# Patient Record
Sex: MALE | Race: WHITE | Employment: FULL TIME | URBAN - METROPOLITAN AREA
[De-identification: names, ages, dates, MRNs, and addresses within clinical notes are randomized per-mention and may not be internally consistent; named-entity substitution may affect disease eponyms.]

---

## 2018-04-25 ENCOUNTER — HOSPITAL ENCOUNTER (OUTPATIENT)
Dept: SURGERY | Age: 63
Discharge: HOME | End: 2018-04-25

## 2018-04-25 VITALS — HEIGHT: 72 IN | WEIGHT: 210 LBS | BODY MASS INDEX: 28.44 KG/M2

## 2018-04-25 NOTE — PERIOP NOTES
Patient verified name, , and procedure. Type: 1a; abbreviated assessment per anesthesia guidelines  Labs per surgeon: none  Labs per anesthesia: none    Instructed pt that they will be notified via office/GI LAB for time of arrival to GI lab. Follow diet and prep instructions per Dr Suzanne Lincoln instructions as follows: You will be on a clear liquid diet the day before your procedure and you may have any of the following clear liquids:   Water.  Strained fruit juices, without pulp, including apple, orange, white grape, or white cranberry.  Limeade or lemonade.  Coffee or tea (do not use any non-dairy creamer.)   Chicken broth.  Gelatin desserts, without added fruit or toppings (no red or purple gelatin.)  You should NOT:   Do NOT drink any milk products or use any milk products in coffee or tea.  Do NOT drink anything with red or purple dye.  Do NOT drink any alcoholic beverages. Bath or shower the night before and the am of surgery with non-moisturizing soap. No lotions, oils, powders, cologne on skin. No make up, eye make up or jewelry. Wear loose fitting comfortable, clean clothing. Must have adult present in building the entire time and MUST bring someone with you or arrange for someone to drive you home after the test.      You may take medications up to 3 hours prior to your procedure with sips of water only. Medications to take use Flonase nasal spray, if needed; Sildenafil, patient to hold none    The following discharge instructions reviewed with patient: medication given during procedure may cause drowsiness for several hours, therefore, do not drive or operate machinery for remainder of the day, no alcohol on the day of your procedure, resume regular diet and activity unless otherwise directed, you may experience abdominal distention for several hours that is relieved by the passage of gas.  Contact your physician if you have any of the following: fever or chills, severe abdominal pain or excessive amount of bleeding or a large amount when having a bowel movement.  Occasional specks of blood with bowel movement would not be unusual.

## 2018-04-30 ENCOUNTER — ANESTHESIA EVENT (OUTPATIENT)
Dept: ENDOSCOPY | Age: 63
End: 2018-04-30
Payer: COMMERCIAL

## 2018-04-30 ENCOUNTER — HOSPITAL ENCOUNTER (OUTPATIENT)
Age: 63
Setting detail: OUTPATIENT SURGERY
Discharge: HOME | End: 2018-04-30
Attending: SURGERY | Admitting: SURGERY
Payer: COMMERCIAL

## 2018-04-30 ENCOUNTER — ANESTHESIA (OUTPATIENT)
Dept: ENDOSCOPY | Age: 63
End: 2018-04-30
Payer: COMMERCIAL

## 2018-04-30 VITALS
BODY MASS INDEX: 28.4 KG/M2 | OXYGEN SATURATION: 98 % | SYSTOLIC BLOOD PRESSURE: 116 MMHG | DIASTOLIC BLOOD PRESSURE: 67 MMHG | TEMPERATURE: 97.8 F | WEIGHT: 209.44 LBS | HEART RATE: 59 BPM | RESPIRATION RATE: 18 BRPM

## 2018-04-30 PROBLEM — Z12.11 SCREENING FOR COLON CANCER: Status: ACTIVE | Noted: 2018-04-30

## 2018-04-30 PROCEDURE — 74011250637 HC RX REV CODE- 250/637: Performed by: ANESTHESIOLOGY

## 2018-04-30 PROCEDURE — 76060000031 HC ANESTHESIA FIRST 0.5 HR: Performed by: SURGERY

## 2018-04-30 PROCEDURE — 74011250636 HC RX REV CODE- 250/636

## 2018-04-30 PROCEDURE — 74011250636 HC RX REV CODE- 250/636: Performed by: ANESTHESIOLOGY

## 2018-04-30 PROCEDURE — 77030009426 HC FCPS BIOP ENDOSC BSC -B: Performed by: SURGERY

## 2018-04-30 PROCEDURE — 77030011640 HC PAD GRND REM COVD -A: Performed by: SURGERY

## 2018-04-30 PROCEDURE — 88305 TISSUE EXAM BY PATHOLOGIST: CPT | Performed by: SURGERY

## 2018-04-30 PROCEDURE — 76040000025: Performed by: SURGERY

## 2018-04-30 PROCEDURE — 74011000250 HC RX REV CODE- 250

## 2018-04-30 RX ORDER — SODIUM CHLORIDE, SODIUM LACTATE, POTASSIUM CHLORIDE, CALCIUM CHLORIDE 600; 310; 30; 20 MG/100ML; MG/100ML; MG/100ML; MG/100ML
25 INJECTION, SOLUTION INTRAVENOUS CONTINUOUS
Status: DISCONTINUED | OUTPATIENT
Start: 2018-04-30 | End: 2018-04-30 | Stop reason: HOSPADM

## 2018-04-30 RX ORDER — SODIUM CHLORIDE, SODIUM LACTATE, POTASSIUM CHLORIDE, CALCIUM CHLORIDE 600; 310; 30; 20 MG/100ML; MG/100ML; MG/100ML; MG/100ML
100 INJECTION, SOLUTION INTRAVENOUS CONTINUOUS
Status: CANCELLED | OUTPATIENT
Start: 2018-04-30

## 2018-04-30 RX ORDER — FAMOTIDINE 20 MG/1
20 TABLET, FILM COATED ORAL
Status: CANCELLED | OUTPATIENT
Start: 2018-04-30

## 2018-04-30 RX ORDER — FAMOTIDINE 20 MG/1
20 TABLET, FILM COATED ORAL ONCE
Status: COMPLETED | OUTPATIENT
Start: 2018-04-30 | End: 2018-04-30

## 2018-04-30 RX ORDER — PROPOFOL 10 MG/ML
INJECTION, EMULSION INTRAVENOUS
Status: DISCONTINUED | OUTPATIENT
Start: 2018-04-30 | End: 2018-04-30 | Stop reason: HOSPADM

## 2018-04-30 RX ORDER — SODIUM CHLORIDE 0.9 % (FLUSH) 0.9 %
5-10 SYRINGE (ML) INJECTION AS NEEDED
Status: DISCONTINUED | OUTPATIENT
Start: 2018-04-30 | End: 2018-04-30 | Stop reason: HOSPADM

## 2018-04-30 RX ORDER — LIDOCAINE HYDROCHLORIDE 20 MG/ML
INJECTION, SOLUTION EPIDURAL; INFILTRATION; INTRACAUDAL; PERINEURAL AS NEEDED
Status: DISCONTINUED | OUTPATIENT
Start: 2018-04-30 | End: 2018-04-30 | Stop reason: HOSPADM

## 2018-04-30 RX ORDER — SODIUM CHLORIDE, SODIUM LACTATE, POTASSIUM CHLORIDE, CALCIUM CHLORIDE 600; 310; 30; 20 MG/100ML; MG/100ML; MG/100ML; MG/100ML
75 INJECTION, SOLUTION INTRAVENOUS CONTINUOUS
Status: DISCONTINUED | OUTPATIENT
Start: 2018-04-30 | End: 2018-04-30 | Stop reason: HOSPADM

## 2018-04-30 RX ADMIN — LIDOCAINE HYDROCHLORIDE 20 MG: 20 INJECTION, SOLUTION EPIDURAL; INFILTRATION; INTRACAUDAL; PERINEURAL at 08:27

## 2018-04-30 RX ADMIN — SODIUM CHLORIDE, SODIUM LACTATE, POTASSIUM CHLORIDE, AND CALCIUM CHLORIDE: 600; 310; 30; 20 INJECTION, SOLUTION INTRAVENOUS at 08:24

## 2018-04-30 RX ADMIN — FAMOTIDINE 20 MG: 20 TABLET ORAL at 07:49

## 2018-04-30 RX ADMIN — SODIUM CHLORIDE, SODIUM LACTATE, POTASSIUM CHLORIDE, AND CALCIUM CHLORIDE 75 ML/HR: 600; 310; 30; 20 INJECTION, SOLUTION INTRAVENOUS at 07:49

## 2018-04-30 RX ADMIN — PROPOFOL 140 MCG/KG/MIN: 10 INJECTION, EMULSION INTRAVENOUS at 08:27

## 2018-04-30 NOTE — IP AVS SNAPSHOT
[facility] 
 
 
 [place] [id number] 
[phone] Patient: Cassie Marquez MRN: [id number] NDA:3/6/8385 About your hospitalization You were admitted on:  April 30, 2018 You last received care in the:  [facility] PACU You were discharged on:  April 30, 2018 Why you were hospitalized Your primary diagnosis was:  Screening For Colon Cancer Follow-up Information Follow up With Details Comments Contact Info Vimal Bettencourt DO  AS DIRECTED Melanie  [address] [facility] [address] 
[phone] Discharge Orders None A check brooklyn indicates which time of day the medication should be taken. My Medications ASK your doctor about these medications Instructions Each Dose to Equal  
 Morning Noon Evening Bedtime  
 allopurinol 300 mg tablet Commonly known as:  Jailyn Funez Your last dose was: Your next dose is: Take 1 Tab by mouth daily. Indications: Gout 300 mg  
    
   
   
   
  
 fluticasone 50 mcg/actuation nasal spray Commonly known as:  Cirilo Kohler Your last dose was: Your next dose is: 2 Sprays by Both Nostrils route daily. 2 Spray  
    
   
   
   
  
 multivitamin tablet Commonly known as:  ONE A DAY Your last dose was: Your next dose is: Take 1 Tab by mouth daily. 1 Tab  
    
   
   
   
  
 rosuvastatin 5 mg tablet Commonly known as:  CRESTOR Your last dose was: Your next dose is: Take 1 Tab by mouth nightly. 5 mg  
    
   
   
   
  
 sildenafil (antihypertensive) 20 mg tablet Commonly known as:  REVATIO Your last dose was: Your next dose is: Take 1 Tab by mouth three (3) times daily. 20 mg Discharge Instructions Gastrointestinal Colonoscopy/Flexible Sigmoidoscopy Lower Exam Discharge Instructions 1. Call your doctor for any problems or questions. 2. Contact the doctors office for follow up appointment as directed 3. Medication may cause drowsiness for several hours, therefore, do not drive or operate machinery for remainder of the day. 4. No alcohol today. 5. Ordinarily, you may resume regular diet and activity after exam unless otherwise specified by your physician. 6. Because of air put into your colon during exam, you may experience some abdominal distension, relieved by the passage of gas, for several hours. 7. Contact your physician if you have any of the following: 
a. Excessive amount of bleeding  large amount when having a bowel movement. Occasional specks of blood with bowel movement would not be unusual. 
b. Severe abdominal pain 
c. Fever or Chills Introducing [facility]! Landen Calix introduces [employer] patient portal. Now you can access parts of your medical record, email your doctor's office, and request medication refills online. 1. In your internet browser, go to [email or web address] [email or web address] 2. Click on the First Time User? Click Here link in the Sign In box. You will see the New Member Sign Up page. 3. Enter your [employer] Access Code exactly as it appears below. You will not need to use this code after youve completed the sign-up process. If you do not sign up before the expiration date, you must request a new code. · [employer] Access Code: [email or web address] Expires: 6/3/2018  9:27 AM 
 
4. Enter the last four digits of your Social Security Number (xxxx) and Date of Birth (mm/dd/yyyy) as indicated and click Submit. You will be taken to the next sign-up page. 5. Create a Meilelet ID. This will be your [employer] Engine login ID and cannot be changed, so think of one that is secure and easy to remember. 6. Create a Meilelet password. You can change your password at any time. 7. Enter your Password Reset Question and Answer. This can be used at a later time if you forget your password. 8. Enter your e-mail address. You will receive e-mail notification when new information is available in [address] 9. Click Sign Up. You can now view and download portions of your medical record. 10. Click the Download Summary menu link to download a portable copy of your medical information. If you have questions, please visit the Frequently Asked Questions section of the my4oneone website. Remember, my4oneone is NOT to be used for urgent needs. For medical emergencies, dial 911. Now available from your iPhone and Android! Introducing Torrey Carbajal As a [facility] patient, I wanted to make you aware of our electronic visit tool called Torrey [employer]. [place] 24/WowOwow allows you to connect within minutes with a medical provider 24 hours a day, seven days a week via a mobile device or tablet or logging into a secure website from your computer. You can access Torrey Carbajal from anywhere in the [place]. A virtual visit might be right for you when you have a simple condition and feel like you just dont want to get out of bed, or cant get away from work for an appointment, when your regular [facility] provider is not available (evenings, weekends or holidays), or when youre out of town and need minor care. Electronic visits cost only $49 and if the [place] 24/7 provider determines a prescription is needed to treat your condition, one can be electronically transmitted to a nearby pharmacy*. Please take a moment to enroll today if you have not already done so. The enrollment process is free and takes just a few minutes. To enroll, please download the LangoLab/WowOwow kaylyn to your tablet or phone, or visit [email or web address] to enroll on your computer.    
And, as an [address] [place] [address] patient with a [employer] account, the results of your visits will be scanned into your electronic medical record and your primary care provider will be able to view the scanned results. We urge you to continue to see your regular [employer] provider for your ongoing medical care. And while your primary care provider may not be the one available when you seek a Torrey Taylorjeremiahfin virtual visit, the peace of mind you get from getting a real diagnosis real time can be priceless. For more information on Torrey Taylorjeremiahfin, view our Frequently Asked Questions (FAQs) at [email or web address]. Sincerely, 
 
Payton Berman MD 
Chief Medical Officer [address] *:  certain medications cannot be prescribed via Torrey Taylordeepika Providers Seen During Your Hospitalization Provider Specialty Primary office phone Brayan Rae, [address] [place] [address] [phone] Your Primary Care Physician (PCP) Primary Care Physician Office Phone Office Fax [address] [address] [phone] You are allergic to the following No active allergies Recent Documentation Weight BMI Smoking Status 95 kg 28.4 kg/m2 Never Smoker Emergency Contacts Name Discharge Info Relation Home Work [facility] DISCHARGE CAREGIVER [3] Spouse [3] [phone] Patient Belongings The following personal items are in your possession at time of discharge: 
                             
 
  
  
 Please provide this summary of care documentation to your next provider. Signatures-by signing, you are acknowledging that this After Visit Summary has been reviewed with you and you have received a copy. Patient Signature:  ____________________________________________________________ Date:  ____________________________________________________________  
  
Favio Murray  Provider Signature: ____________________________________________________________ Date:  ____________________________________________________________

## 2018-04-30 NOTE — ANESTHESIA POSTPROCEDURE EVALUATION
Post-Anesthesia Evaluation and Assessment    Patient: Sofia Tello MRN: [id number]  SSN: [id number]    YOB: 1955  Age: 66 y.o. Sex: male       Cardiovascular Function/Vital Signs  Visit Vitals    /66 (BP 1 Location: Left arm, BP Patient Position: At rest)    Pulse (!) 54    Temp 36.6 °C (97.8 °F)    Resp 16    Wt 95 kg (209 lb 7 oz)    SpO2 97%    BMI 28.4 kg/m2       Patient is status post total IV anesthesia anesthesia for Procedure(s):  COLONOSCOPY / BMI=28  ENDOSCOPIC POLYPECTOMY. Nausea/Vomiting: None    Postoperative hydration reviewed and adequate. Pain:  Pain Scale 1: Numeric (0 - 10) (04/30/18 0902)  Pain Intensity 1: 0 (04/30/18 0902)   Managed    Neurological Status: At baseline    Mental Status and Level of Consciousness: Arousable    Pulmonary Status:   O2 Device: Room air (04/30/18 0902)   Adequate oxygenation and airway patent    Complications related to anesthesia: None    Post-anesthesia assessment completed.  No concerns    Signed By: Prashanth Cristobal MD     April 30, 2018

## 2018-04-30 NOTE — H&P
Piter Mathis    2018    Date of Admission:  2018      Subjective:     Patient is a 66 y.o.  male presents for screening colonoscopy. The patient reports no problems. The patient denies family history of colon cancer. The patient has had a colonoscopy in the past. His last colonoscopy was 11 years ago and was normal except for one polyp. Otherwise there is no reported rectal bleeding or melena. No changes in appetite or unusual wt loss. No abdominal pain or bloating. No reported changes in bowel habits. Patient Active Problem List    Diagnosis Date Noted    Screening for colon cancer 2018    Erectile dysfunction 2015    Abnormal PSA 2015    Gout 2014    Peyronie's disease 2013    Hyperlipidemia 2013    Vitamin D deficiency 2013     Past Medical History:   Diagnosis Date    Gout     Hyperlipidemia     Peyronie's disease       Past Surgical History:   Procedure Laterality Date    HX HEART CATHETERIZATION      15 years ago    HX KNEE ARTHROSCOPY Left       Prior to Admission Medications   Prescriptions Last Dose Informant Patient Reported? Taking?   allopurinol (ZYLOPRIM) 300 mg tablet 2018 at Unknown time  No Yes   Sig: Take 1 Tab by mouth daily. Indications: Gout   Patient taking differently: Take 300 mg by mouth nightly. Indications: Gout   fluticasone (FLONASE) 50 mcg/actuation nasal spray 2018 at Unknown time  No Yes   Si Sprays by Both Nostrils route daily. Patient taking differently: 2 Sprays by Both Nostrils route as needed. Take / use AM day of surgery  per anesthesia protocols. Indications: Allergic Rhinitis   multivitamin (ONE A DAY) tablet 2018 at Unknown time  Yes Yes   Sig: Take 1 Tab by mouth daily. rosuvastatin (CRESTOR) 5 mg tablet 2018 at Unknown time  No Yes   Sig: Take 1 Tab by mouth nightly. Patient taking differently: Take 5 mg by mouth nightly.  Indications: hyperlipidemia sildenafil, antihypertensive, (REVATIO) 20 mg tablet 4/23/2018 at Unknown time  No Yes   Sig: Take 1 Tab by mouth three (3) times daily. Patient taking differently: Take 20 mg by mouth three (3) times daily. Takes for Peyronie's disease      Facility-Administered Medications: None     No Known Allergies   Social History   Substance Use Topics    Smoking status: Never Smoker    Smokeless tobacco: Never Used    Alcohol use 3.6 oz/week     6 Cans of beer per week      Comment: 6-12 in a week of beer       Social History     Social History Narrative     Family History   Problem Relation Age of Onset    Diabetes Mother     Diabetes Father     Alzheimer Father     Gout Father         Current Facility-Administered Medications   Medication Dose Route Frequency    lactated Ringers infusion  75 mL/hr IntraVENous CONTINUOUS       Review of Systems  A comprehensive review of systems was negative except for that written in the HPI.     Objective:     Vitals:    04/30/18 0715   BP: 157/82   Pulse: 65   Resp: 18   Temp: 98.1 °F (36.7 °C)   SpO2: 98%   Weight: 209 lb 7 oz (95 kg)     PHYSICAL EXAM   Physical Examination: General appearance - alert, well appearing, and in no distress  Mental status - alert, oriented to person, place, and time  Eyes - pupils equal and reactive, extraocular eye movements intact  Nose - normal and patent, no erythema, discharge or polyps  Neck - supple, no significant adenopathy  Chest - clear to auscultation, no wheezes, rales or rhonchi, symmetric air entry  Heart - normal rate, regular rhythm, normal S1, S2, no murmurs, rubs, clicks or gallops  Abdomen - soft, nontender, nondistended, no masses or organomegaly  Neurological - alert, oriented, normal speech, no focal findings or movement disorder noted  Musculoskeletal - no joint tenderness, deformity or swelling  Extremities - peripheral pulses normal, no pedal edema, no clubbing or cyanosis  Skin - normal coloration and turgor, no rashes, no suspicious skin lesions noted      No results for input(s): WBC, HGB, HCT, PLT, HGBEXT, HCTEXT, PLTEXT in the last 72 hours. No results for input(s): NA, K, CL, GLU, CO2, BUN, CREA, MG, PHOS, TROIQ, INR, BNPP in the last 72 hours. No lab exists for component: TROIP, INREXT  No results for input(s): PH, PCO2, PO2, HCO3 in the last 72 hours.     Assessment:     Hospital Problems  Date Reviewed: 2/10/2017          Codes Class Noted POA    * (Principal)Screening for colon cancer ICD-10-CM: Z12.11  ICD-9-CM: V76.51  4/30/2018 Unknown            Plan:   Screening colonoscopy      Tyrell Bettencourt,

## 2018-04-30 NOTE — ANESTHESIA PREPROCEDURE EVALUATION
Anesthetic History               Review of Systems / Medical History  Patient summary reviewed and pertinent labs reviewed    Pulmonary                   Neuro/Psych              Cardiovascular    Hypertension              Exercise tolerance: >4 METS     GI/Hepatic/Renal                Endo/Other             Other Findings              Physical Exam    Airway  Mallampati: I  TM Distance: > 6 cm  Neck ROM: normal range of motion   Mouth opening: Normal     Cardiovascular  Regular rate and rhythm,  S1 and S2 normal,  no murmur, click, rub, or gallop  Rhythm: regular  Rate: normal         Dental  No notable dental hx       Pulmonary  Breath sounds clear to auscultation               Abdominal         Other Findings            Anesthetic Plan    ASA: 2  Anesthesia type: total IV anesthesia            Anesthetic plan and risks discussed with: Patient

## 2018-04-30 NOTE — PROCEDURES
Procedure in Detail:  Informed consent was obtained for the procedure. The patient was placed in the left lateral decubitus position and sedation was induced by anesthesia. The [id number] was inserted into the rectum and advanced under direct vision to the cecum, which was identified by the ileocecal valve and appendiceal orifice. The quality of the colonic preparation was excellent. A careful inspection was made as the colonoscope was withdrawn, including a retroflexed view of the rectum; findings and interventions are described below. Appropriate photodocumentation was obtained. Findings:   Rectum:   Normal  Sigmoid:     - Protruding lesions:     -Sessile Polyp(s) size 5 mm removed by polypectomy (hot biopsy)  Descending Colon:   Normal  Transverse Colon:   Normal  Ascending Colon:   Normal  Cecum:   Normal  Terminal Ileum:   Not entered      Specimens: Specimens were collected and sent to pathology. Complications: None; patient tolerated the procedure well. \    EBL - none    Recommendations:   - Await pathology.      Signed By: Gertrudis Gil DO                        April 30, 2018

## 2019-03-20 PROBLEM — Z12.5 SCREENING FOR PROSTATE CANCER: Status: ACTIVE | Noted: 2019-03-20

## 2019-03-20 PROBLEM — Z00.00 ROUTINE MEDICAL EXAM: Status: ACTIVE | Noted: 2019-03-20

## 2019-09-20 PROBLEM — Z00.00 ROUTINE MEDICAL EXAM: Status: RESOLVED | Noted: 2019-03-20 | Resolved: 2019-09-20

## 2019-09-20 PROBLEM — Z12.11 SCREEN FOR COLON CANCER: Status: RESOLVED | Noted: 2018-04-30 | Resolved: 2019-09-20

## (undated) DEVICE — KENDALL RADIOLUCENT FOAM MONITORING ELECTRODE RECTANGULAR SHAPE: Brand: KENDALL

## (undated) DEVICE — CONTAINER PREFIL FRMLN 40ML --

## (undated) DEVICE — AIRLIFE™ OXYGEN TUBING 7 FEET (2.1 M) CRUSH RESISTANT OXYGEN TUBING, VINYL TIPPED: Brand: AIRLIFE™

## (undated) DEVICE — REM POLYHESIVE ADULT PATIENT RETURN ELECTRODE: Brand: VALLEYLAB

## (undated) DEVICE — CONNECTOR TBNG OD5-7MM O2 END DISP

## (undated) DEVICE — FCPS BX HOT RJ4 2.2MMX240CM -- RADIAL JAW 4 BX/40

## (undated) DEVICE — CANNULA NSL ORAL AD FOR CAPNOFLEX CO2 O2 AIRLFE